# Patient Record
Sex: MALE | Race: WHITE | NOT HISPANIC OR LATINO | URBAN - METROPOLITAN AREA
[De-identification: names, ages, dates, MRNs, and addresses within clinical notes are randomized per-mention and may not be internally consistent; named-entity substitution may affect disease eponyms.]

---

## 2021-03-16 ENCOUNTER — OFFICE VISIT (OUTPATIENT)
Dept: FAMILY MEDICINE | Facility: CLINIC | Age: 21
End: 2021-03-16
Payer: COMMERCIAL

## 2021-03-16 VITALS
HEART RATE: 90 BPM | WEIGHT: 207.4 LBS | BODY MASS INDEX: 28.09 KG/M2 | OXYGEN SATURATION: 95 % | TEMPERATURE: 98.8 F | SYSTOLIC BLOOD PRESSURE: 130 MMHG | HEIGHT: 72 IN | DIASTOLIC BLOOD PRESSURE: 78 MMHG

## 2021-03-16 DIAGNOSIS — R05.9 COUGH: ICD-10-CM

## 2021-03-16 DIAGNOSIS — J06.9 VIRAL UPPER RESPIRATORY TRACT INFECTION: ICD-10-CM

## 2021-03-16 DIAGNOSIS — J02.9 ACUTE PHARYNGITIS, UNSPECIFIED ETIOLOGY: Primary | ICD-10-CM

## 2021-03-16 LAB — S PYO AG THROAT QL IA.RAPID: NEGATIVE

## 2021-03-16 ASSESSMENT — MIFFLIN-ST. JEOR: SCORE: 1996.94

## 2021-03-16 NOTE — PROGRESS NOTES
"       HPI       Gorge Moses is a 20 year old  who presents for   Chief Complaint   Patient presents with     Sinus Problem   20 year-old male presents to clinic with persistent cough x 2 weeks, has gotten worse past 4 days. Cough is productive green phleqm by report. Denies fever or chills, but maybe night sweats. Denies SOB or wheezing. Does not feel fatigued.  Has had sinusitis in the past, about 4 years ago. Draining down back of throat and throat is slightly sore. Denies tooth pain, increased pressure on forward bend or rhinorrhea. Has not tried anything for symptoms.   Recently traveled to [place] to see family.  Has had 2 COVID tests that were both negative in the past 1 week. No known exposures. Classes are online.     Problem, Medication and Allergy Lists were reviewed and updated if needed..    Patient is a new patient to this clinic and so  I reviewed/updated the Past Medical History, the Family History and the Social History .History of sinusitis, no asthma, no seasonal allergies.          Review of Systems:   Review of Systems  GEN: negative for fever, has appetite. Sleeping OK  HEENT; as per HPI  RESP: as per HPI  CV: negative chest pain           Physical Exam:     Vitals:    03/16/21 1511   BP: 130/78   Pulse: 90   Temp: 98.8  F (37.1  C)   TempSrc: Oral   SpO2: 95%   Weight: 94.1 kg (207 lb 6.4 oz)   Height: 1.834 m (6' 0.2\")     Body mass index is 27.97 kg/m .  Vital signs normal except BP slightly elevated and SpO2 low normal.      Physical Exam  GEN: alert in NAD.   HEENT: EYEs clear NAHOMY, EOM intact.  Negative erythema or discharge.  Ears: TMs gray with LR. Nose: edematous erythematous turbinates, scant discharge. Negative facial tenderness to palpation.  OP: erythema posterior pharynx, with post nasal drainage evident. White spot left tonsil. Oral mucosa moist.   LYMPH: negative.   NECK: Full ROM  LUNGS: CTA with air entry throughout  CV: HRRR, S1 S2 no MRG  Skin: clear without rash.     " Results:      Results from this visit  Results for orders placed or performed in visit on 03/16/21   Strep Screen Rapid (Group) ([facility])     Status: None   Result Value Ref Range    Rapid Strep A Screen Negative Negative       Assessment and Plan      1. Acute pharyngitis, unspecified etiology  Will send TC  Given erythema  And white patch on exam. Recommend salt water gargles.   - Strep Screen Rapid (Group) ([facility])  - Strep Culture (GABS)    2. Cough  Likely viral as lungs are clear, however, if symptoms persist beyond 7 days or gets fever, needs to call or return.     3. Viral upper respiratory tract infection  Comfort measures: fluids, rest, netti pot. Discussed OTC medications: Mucinex or Robitussin DM as directed.         There are no discontinued medications.    Options for treatment and follow-up care were reviewed with the patient. Gorge Moses  engaged in the decision making process and verbalized understanding of the options discussed and agreed with the final plan.    MYKEL Balderas CNP

## 2021-03-16 NOTE — NURSING NOTE
"20 year old  Chief Complaint   Patient presents with     Sinus Problem       Blood pressure 130/78, pulse 90, temperature 98.8  F (37.1  C), temperature source Oral, height 1.834 m (6' 0.2\"), weight 94.1 kg (207 lb 6.4 oz), SpO2 95 %. Body mass index is 27.97 kg/m .      There is no problem list on file for this patient.      Wt Readings from Last 2 Encounters:   03/16/21 94.1 kg (207 lb 6.4 oz) (95 %, Z= 1.60)*     * Growth percentiles are based on CDC (Boys, 2-20 Years) data.     BP Readings from Last 3 Encounters:   03/16/21 130/78       No Known Allergies    No current outpatient medications on file.     No current facility-administered medications for this visit.        Social History     Tobacco Use     Smoking status: Never Smoker     Smokeless tobacco: Never Used   Substance Use Topics     Alcohol use: None     Drug use: None       Honoring Choices - Health Care Directive Guide offered to patient at time of visit.    Health Maintenance Due   Topic Date Due     PREVENTIVE CARE VISIT  Never done     ADVANCE CARE PLANNING  Never done     DTAP/TDAP/TD IMMUNIZATION (1 - Tdap) Never done     HPV IMMUNIZATION (1 - Male 2-dose series) Never done     HIV SCREENING  Never done     HEPATITIS C SCREENING  Never done         There is no immunization history on file for this patient.    No results found for: PAP      No lab results found.    PHQ-2 ( 1999 Pfizer) 3/16/2021   Q1: Little interest or pleasure in doing things 0   Q2: Feeling down, depressed or hopeless 0   PHQ-2 Score 0       No flowsheet data found.    No flowsheet data found.    No flowsheet data found.      Teresita Calhoun, [facility]  March 16, 2021 3:13 PM    "

## 2021-03-16 NOTE — PATIENT INSTRUCTIONS
Persistent cough without fever: no signs of sinusitis or pneumonia  Try over the counter:  Robitussin DM as per package  Plenty of fluids  Water  If develop fever, feeling worse (Shortness of breath, fatigue), please return to clinic or notify on my chart  Salt water gargles for sore throat (1/2 tsp salt in 1 cup warm water)  Mucalytic like Mucinex alone would be fine too

## 2021-03-16 NOTE — LETTER
March 19, 2021      Gorge Moses  [address] [place] [address]        Dear ,    We are writing to inform you of your test results.    Your test results fall within the expected range(s) or remain unchanged from previous results.  Please continue with current treatment plan.    Resulted Orders   Strep Screen Rapid (Group) ([facility])   Result Value Ref Range    Rapid Strep A Screen Negative Negative   Strep Culture (GABS)   Result Value Ref Range    Specimen Description Throat     Special Requests Specimen collected in eSwab transport (white cap)     Culture Micro No Beta Streptococcus isolated      Hi Gorge, your throat culture is negative. If your cough persists beyond 10 days or you develop fever and chills or shortness of breath,please call or return to the clinic. Thank you.   If you have any questions or concerns, please call the clinic at the number listed above.       Sincerely,      MYKEL Balderas CNP

## 2021-03-18 LAB
BACTERIA SPEC CULT: NORMAL
Lab: NORMAL
SPECIMEN SOURCE: NORMAL

## 2021-06-29 ENCOUNTER — OFFICE VISIT (OUTPATIENT)
Dept: FAMILY MEDICINE | Facility: CLINIC | Age: 21
End: 2021-06-29
Payer: COMMERCIAL

## 2021-06-29 VITALS
SYSTOLIC BLOOD PRESSURE: 144 MMHG | BODY MASS INDEX: 29.26 KG/M2 | HEIGHT: 72 IN | DIASTOLIC BLOOD PRESSURE: 76 MMHG | TEMPERATURE: 98.1 F | HEART RATE: 64 BPM | OXYGEN SATURATION: 95 % | WEIGHT: 216 LBS

## 2021-06-29 DIAGNOSIS — H93.90 EAR LESION: Primary | ICD-10-CM

## 2021-06-29 DIAGNOSIS — B00.9 HERPES SIMPLEX VIRUS INFECTION: ICD-10-CM

## 2021-06-29 RX ORDER — MUPIROCIN 20 MG/G
OINTMENT TOPICAL 3 TIMES DAILY
Qty: 22 G | Refills: 1 | Status: SHIPPED | OUTPATIENT
Start: 2021-06-29 | End: 2021-07-04

## 2021-06-29 RX ORDER — VALACYCLOVIR HYDROCHLORIDE 1 G/1
1000 TABLET, FILM COATED ORAL 2 TIMES DAILY
Qty: 14 TABLET | Refills: 0 | Status: SHIPPED | OUTPATIENT
Start: 2021-06-29 | End: 2023-09-13

## 2021-06-29 ASSESSMENT — ANXIETY QUESTIONNAIRES
3. WORRYING TOO MUCH ABOUT DIFFERENT THINGS: NOT AT ALL
7. FEELING AFRAID AS IF SOMETHING AWFUL MIGHT HAPPEN: NOT AT ALL
1. FEELING NERVOUS, ANXIOUS, OR ON EDGE: SEVERAL DAYS
IF YOU CHECKED OFF ANY PROBLEMS ON THIS QUESTIONNAIRE, HOW DIFFICULT HAVE THESE PROBLEMS MADE IT FOR YOU TO DO YOUR WORK, TAKE CARE OF THINGS AT HOME, OR GET ALONG WITH OTHER PEOPLE: NOT DIFFICULT AT ALL
6. BECOMING EASILY ANNOYED OR IRRITABLE: NOT AT ALL
5. BEING SO RESTLESS THAT IT IS HARD TO SIT STILL: NOT AT ALL
2. NOT BEING ABLE TO STOP OR CONTROL WORRYING: NOT AT ALL
GAD7 TOTAL SCORE: 1

## 2021-06-29 ASSESSMENT — MIFFLIN-ST. JEOR: SCORE: 2031.18

## 2021-06-29 ASSESSMENT — PATIENT HEALTH QUESTIONNAIRE - PHQ9
5. POOR APPETITE OR OVEREATING: NOT AT ALL
SUM OF ALL RESPONSES TO PHQ QUESTIONS 1-9: 0

## 2021-06-29 NOTE — PROGRESS NOTES
"       HPI       Gorge Moses is a 20 year old  who presents for   Chief Complaint   Patient presents with     Ear Problem     feels swelling, yesterday saw provider and they said it was a big bite, this morning there was blood on pillow and in ear       Problem, Medication and Allergy Lists were reviewed and updated if needed..    20 year-old male is an established patient of this clinic.. Here for right sided ear pain. Patient reports he noticed pain to the outside if his right ear starting 3 days ago. He sought medical care 2 days ago and was diagnosed with a bug bite at that time. Patient states that he continued to have pain and woke this morning with dried blood to the outside of his ear as well as dried blood to his pillow case. Patient states last night patient had 4/10 pain and today the pain is 2/10. [place] Has been cleaning it with soap and water. No history of swimmers ear. He denies skin cuts wounds to his knowledge. 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 He has not been camping or hiking recently.          Review of Systems:   Review of Systems   GEN Patient feeling well overall  ENT Ear pain as per HPI, no nasal pressure or drainage, no sore throat. No history of cold sores or herpes. No know exposures.   LUNGS no cough, no shortness of breath  HEART no palpitations or chest pain   GI no nausea vomiting diarrhea constipation  SKIN no new rashes, wounds as per HPI         Physical Exam:     Vitals:    06/29/21 1125   BP: (!) 144/76   Pulse: 64   Temp: 98.1  F (36.7  C)   TempSrc: Oral   SpO2: 95%   Weight: 98 kg (216 lb)   Height: 1.826 m (5' 11.9\")     Body mass index is 29.38 kg/m .       Physical Exam   GEN Well appearing patient in no acute distress  ENT right external ear with wound external ear at opening of canal lateral edge.  Dried blood, easily removed with cotton tipped applicator to reveal 3 honey crusted lesions, " vesicular and clustered in appearance, tender to touch, no surrounding erythemia, no active bleeding or drainage noted. Tenderness noted to right post-aricular space, no pain with tragus tug, external ear slightly swollen in appearance. TM gray with LR. External ear canal clear without  erythema or tenderness with speculum exam. TM visualized and clear. Left external and internal ear clear. Bilateral nares patent, no swelling to mucous membranes, no drainage, no lesions. OP: throat slight erythema posterior pharynx, no tonsillar enlargement or exudate.   Neck supple.  Lungs clear air movement throughout, no wheezing,  Heart s1 s2, RRR, no murmur  Skin: no rashes or other wounds elsewhere      Results:   Labs: cultures of ear pending: Strep and herpes    Assessment and Plan       Right ear lesion   Honey color crust suggests strep skin infection  Culture sent  Apply mupiricin to ear lesion 2-3 times a day for next 5-7 days.   Will notify patient of culture result  Other possibility is herpes infection. Herpes culture sent. Begin Valtrex 1 gram twice daily    1. Ear lesion  - Strep Culture (GABS)  - valACYclovir (VALTREX) 1000 mg tablet; Take 1 tablet (1,000 mg) by mouth 2 times daily for 7 days  Dispense: 14 tablet; Refill: 0  - mupirocin (BACTROBAN) 2 % external ointment; Apply topically 3 times daily for 5 days  Dispense: 22 g; Refill: 1    2. Herpes simplex virus infection    - valACYclovir (VALTREX) 1000 mg tablet; Take 1 tablet (1,000 mg) by mouth 2 times daily for 7 days  Dispense: 14 tablet; Refill: 0       There are no discontinued medications.    Options for treatment and follow-up care were reviewed with the patient. Gorge Moses  engaged in the decision making process and verbalized understanding of the options discussed and agreed with the final plan.    Kanu Murphy FNP student  I was present with the NPP student who participated in the service and in the documentation of the services provided. I have  verified the history and personally performed the physical exam and medical decision making, as documented by the student and edited by MYKEL Woo CNP  06/29/21  4:55 PM      MYKEL Balderas CNP

## 2021-06-29 NOTE — NURSING NOTE
"ROOM:1    Preferred Name: Gorge     20 year old  Chief Complaint   Patient presents with     Ear Problem     feels swelling, yesterday saw provider and they said it was a big bite, this morning there was blood on pillow and in ear       Blood pressure (!) 144/76, pulse 64, temperature 98.1  F (36.7  C), temperature source Oral, height 1.826 m (5' 11.9\"), weight 98 kg (216 lb), SpO2 95 %. Body mass index is 29.38 kg/m .      There is no problem list on file for this patient.      Wt Readings from Last 2 Encounters:   06/29/21 98 kg (216 lb) (96 %, Z= 1.77)*   03/16/21 94.1 kg (207 lb 6.4 oz) (95 %, Z= 1.60)*     * Growth percentiles are based on [facility] (Boys, 2-20 Years) data.     BP Readings from Last 3 Encounters:   06/29/21 (!) 144/76   03/16/21 130/78       No Known Allergies    No current outpatient medications on file.     No current facility-administered medications for this visit.        Social History     Tobacco Use     Smoking status: Never Smoker     Smokeless tobacco: Never Used   Substance Use Topics     Alcohol use: None     Drug use: None       Honoring Choices - Health Care Directive Guide offered to patient at time of visit.    Health Maintenance Due   Topic Date Due     PREVENTIVE CARE VISIT  Never done     ADVANCE CARE PLANNING  Never done     DTAP/TDAP/TD IMMUNIZATION (1 - Tdap) Never done     HPV IMMUNIZATION (1 - Male 2-dose series) Never done     COVID-19 Vaccine (1) Never done     HIV SCREENING  Never done     HEPATITIS C SCREENING  Never done         There is no immunization history on file for this patient.    No results found for: PAP      No lab results found.    PHQ-2 ( 1999 Pfizer) 3/16/2021   Q1: Little interest or pleasure in doing things 0   Q2: Feeling down, depressed or hopeless 0   PHQ-2 Score 0       PHQ-9 SCORE 6/29/2021   PHQ-9 Total Score 0       CHUY-7 SCORE 6/29/2021   Total Score 1       No flowsheet data found.      Teresita Calhoun CMA  June 29, 2021 11:27 AM    "

## 2021-06-29 NOTE — LETTER
July 1, 2021      Randall R Josué  [address] [place] [address]        Dear ,    We are writing to inform you of your test results.    Test results indicate you may require additional follow up, see comment below.    Resulted Orders   Strep Culture (GABS)   Result Value Ref Range    Specimen Description Right Ear     Special Requests Specimen collected in eSwab transport (white cap)     Culture Micro       Canceled, Test credited  Inappropriate specimen type  Test reordered as correct code      Culture Micro       Notification of test cancellation was given to  Sandra Ball CNP via EPIC message 6/30/21. scg      Culture Micro       Notification of test cancellation was given to  Sandrine from [facility] clinic. 7/1/21 at 0824.TV.     Ear Culture Aerobic Bacterial   Result Value Ref Range    Specimen Description Right Ear     Special Requests Specimen collected in eSwab transport (white cap)     Culture Micro Heavy growth  Staphylococcus aureus   (A)     Culture Micro Culture in progress    Herpes Simplex Virus 1&2 PCR Swab   Result Value Ref Range    Herpes Specimen Description Right Ear     HSV Type 1 PCR Not Detected NDET^Not Detected      Comment:      Herpes simplex virus Type 1 DNA NOT detected, presumed negative for HSV-1. A   negative result does not rule out the presence of PCR inhibitors or HSV DNA in   concentrations below the limit of detection of the assay.      HSV Type 2 PCR Not Detected NDET^Not Detected      Comment:      Herpes simplex virus Type 2 DNA NOT detected, presumed negative for HSV-2. A   negative result does not rule out the presence of PCR inhibitors or HSV DNA in   concentrations below the limit of detection of the assay.      HSV Comment (Note)       Comment:      The [employer] Molecular Simplexa HSV 1 & 2 Direct assay on the [employer]   instrument is an FDA approved, real-time PCR test for the qualitative   detection and differentiation of Herpes Simplex virus Types 1 & 2 DNA  in   mucocutaneous and cutaneous lesion swabs from patients with signs and symptoms   of HSV-1 or HSV-2 infection.     Rj Pennington, the culture result of your ear shows heavy staph aureus group. Staph is a normal skin bacteria, but can sometimes overgrow when there is a break in the skin.  I do not yet have sensitivities back which I did request. Meanwhile, the mupiricin ointment I prescribed should work since it is an isolated area. Please let me know if it does not resolve. I am not able to leave a voicemail at the cell number in the chart and did not know if you wanted me to contact your mother. Thank you.     If you have any questions or concerns, please call the clinic at the number listed above.       Sincerely,      MYKEL Balderas CNP

## 2021-06-29 NOTE — PATIENT INSTRUCTIONS
Right ear lesion  Honey color crust suggests strep skin infection  Culture sent  Apply mupiricin to ear lesion 2-3 times a day for next 5-7 days.   Will notify of culture result  Other possibility is herpes infection. Herpes culture sent. Begin Valtrex 1 gram twice daily

## 2021-06-30 DIAGNOSIS — H93.90 EAR LESION: Primary | ICD-10-CM

## 2021-06-30 LAB
HSV1 DNA SPEC QL NAA+PROBE: NOT DETECTED
HSV2 DNA SPEC QL NAA+PROBE: NOT DETECTED
LABORATORY COMMENT REPORT: NORMAL
SPECIMEN SOURCE: NORMAL

## 2021-06-30 ASSESSMENT — ANXIETY QUESTIONNAIRES: GAD7 TOTAL SCORE: 1

## 2021-07-01 LAB
BACTERIA SPEC CULT: NORMAL
Lab: NORMAL
SPECIMEN SOURCE: NORMAL

## 2021-07-05 ENCOUNTER — TELEPHONE (OUTPATIENT)
Dept: FAMILY MEDICINE | Facility: CLINIC | Age: 21
End: 2021-07-05

## 2021-07-05 LAB
BACTERIA SPEC CULT: ABNORMAL
Lab: ABNORMAL
SPECIMEN SOURCE: ABNORMAL

## 2021-07-05 NOTE — TELEPHONE ENCOUNTER
TC to patient to review ear lesion culture results: heavy growth staph aureus and epidermis. No sensitivities back.  Bactroban was prescribed at visit and patient states this has worked well and lesion has cleared up. Sandra HOGUE CNP

## 2022-02-01 ENCOUNTER — OFFICE VISIT (OUTPATIENT)
Dept: FAMILY MEDICINE | Facility: CLINIC | Age: 22
End: 2022-02-01
Payer: COMMERCIAL

## 2022-02-01 VITALS
HEIGHT: 72 IN | HEART RATE: 71 BPM | SYSTOLIC BLOOD PRESSURE: 128 MMHG | OXYGEN SATURATION: 95 % | BODY MASS INDEX: 29.15 KG/M2 | DIASTOLIC BLOOD PRESSURE: 80 MMHG | TEMPERATURE: 98.4 F | WEIGHT: 215.2 LBS

## 2022-02-01 DIAGNOSIS — R07.0 THROAT PAIN: Primary | ICD-10-CM

## 2022-02-01 LAB
DEPRECATED S PYO AG THROAT QL EIA: NEGATIVE
FLUAV AG SPEC QL IA: NEGATIVE
FLUBV AG SPEC QL IA: NEGATIVE
GROUP A STREP BY PCR: NOT DETECTED
MONOCYTES NFR BLD AUTO: NEGATIVE %
SARS-COV-2 RNA RESP QL NAA+PROBE: NORMAL

## 2022-02-01 PROCEDURE — 86308 HETEROPHILE ANTIBODY SCREEN: CPT | Performed by: NURSE PRACTITIONER

## 2022-02-01 PROCEDURE — U0005 INFEC AGEN DETEC AMPLI PROBE: HCPCS | Performed by: NURSE PRACTITIONER

## 2022-02-01 PROCEDURE — 87651 STREP A DNA AMP PROBE: CPT | Performed by: NURSE PRACTITIONER

## 2022-02-01 RX ORDER — ACETAMINOPHEN 325 MG/1
325-650 TABLET ORAL EVERY 6 HOURS PRN
COMMUNITY

## 2022-02-01 ASSESSMENT — MIFFLIN-ST. JEOR: SCORE: 2025.73

## 2022-02-01 NOTE — NURSING NOTE
"ROOM:2    Preferred Name: Gorge     21 year old  Chief Complaint   Patient presents with     Pharyngitis     Fever       Blood pressure (!) 147/81, pulse 71, temperature 98.4  F (36.9  C), temperature source Oral, height 1.831 m (6' 0.1\"), weight 97.6 kg (215 lb 3.2 oz), SpO2 95 %. Body mass index is 29.11 kg/m .      There is no problem list on file for this patient.      Wt Readings from Last 2 Encounters:   02/01/22 97.6 kg (215 lb 3.2 oz)   06/29/21 98 kg (216 lb) (96 %, Z= 1.77)*     * Growth percentiles are based on CDC (Boys, 2-20 Years) data.     BP Readings from Last 3 Encounters:   02/01/22 (!) 147/81   06/29/21 (!) 144/76   03/16/21 130/78       No Known Allergies    Current Outpatient Medications   Medication     acetaminophen (TYLENOL) 325 MG tablet     valACYclovir (VALTREX) 1000 mg tablet     No current facility-administered medications for this visit.       Social History     Tobacco Use     Smoking status: Never Smoker     Smokeless tobacco: Never Used   Substance Use Topics     Alcohol use: None     Drug use: None       Honoring Choices - Health Care Directive Guide offered to patient at time of visit.    Health Maintenance Due   Topic Date Due     PREVENTIVE CARE VISIT  Never done     ADVANCE CARE PLANNING  Never done     HIV SCREENING  Never done     HEPATITIS C SCREENING  Never done     INFLUENZA VACCINE (1) 09/01/2021       Immunization History   Administered Date(s) Administered     COVID-19,PF,Pfizer (12+ Yrs) 08/19/2021, 09/09/2021       No results found for: PAP      No lab results found.    PHQ-2 ( 1999 Pfizer) 2/1/2022 3/16/2021   Q1: Little interest or pleasure in doing things 0 0   Q2: Feeling down, depressed or hopeless 0 0   PHQ-2 Score 0 0   PHQ-2 Total Score (12-17 Years)- Positive if 3 or more points; Administer PHQ-A if positive - 0       PHQ-9 SCORE 6/29/2021   PHQ-9 Total Score 0       CHUY-7 SCORE 6/29/2021   Total Score 1       No flowsheet data found.      Teresita Calhoun, " CMA  February 1, 2022 1:16 PM

## 2022-02-01 NOTE — PATIENT INSTRUCTIONS
Sore throat  Pending: COVID,monospot  Rapid strep test: Negative  Influenza negative  Comfort measures: salt water gargles: 1/4 tsp salt in 1 cup of warm water: Gargle and spit  OTC medications like tylenol for comfort  Rest  Self isolate until symptoms resolve or afebrile x 24-72 hours (Depends on whether COVID Is positive or not); await lab results. Will notify of results.   Seek care if worsening symptoms

## 2022-02-01 NOTE — PROGRESS NOTES
"       HPI       Gorge Moses is a 21 year old  who presents for   Chief Complaint   Patient presents with     Pharyngitis     Fever     21 year-old male presents to clinic for evaluation of sore throat.  Onset was 4 days ago and has worsened over times. Has been taking tylenol for relief and fever. T of up to 100.8, some chills. Denies URI symptoms, cough, sinus pressure. Has headache at times and feels fatigued. Did not got to school today. Did 2 home tests for COVID, both negative. Had a friend recently who had strep throat.      Problem, Medication and Allergy Lists were reviewed and updated if needed..    Patient is an established patient of this clinic..         Review of Systems:   Review of Systems  GEN: fever as per HPI. Fatigue. Eating and drinking OK. Mostly soft foods.   HEENT: as per HPI. Denies ear pain. Has not lost sense of taste or smell.  RESP: Denies cough, SOB. BP elevated which is not common for him by history  CV: negative for chest pain  SKIN: negative for rash       Physical Exam:     Vitals:    02/01/22 1315 02/01/22 1336   BP: (!) 147/81 128/80   BP Location:  Left arm   Patient Position:  Sitting   Cuff Size:  Adult Regular   Pulse: 71    Temp: 98.4  F (36.9  C)    TempSrc: Oral    SpO2: 95%    Weight: 97.6 kg (215 lb 3.2 oz)    Height: 1.831 m (6' 0.1\")      Body mass index is 29.11 kg/m .  Vital signs normal except BP elevated  Bp recheck 128/80     Physical Exam  GEN: Alert male in NAD  HEENT: eyes clear, NAHOMY, Ears: TMs gray with LR, scant cerumen. Nose:  No edema or erythema of turbinates, no discharge. OP: erythema posterior pharynx, tonsils +1-2, no exudate.  Oral mucosa moist  Neck supple. Lymph negative.   LUNGS: CTA with air entry throughout. SpO2 95%. Respirations unlabored.   CV: HRRR, S1, S2,  No MRG  SKIN: negative for rash or lesions.     Results:      Results from this visit  Results for orders placed or performed in visit on 02/01/22   Influenza A/B antigen     Status: " Normal    Specimen: Nasopharyngeal; Swab   Result Value Ref Range    Influenza A antigen Negative Negative    Influenza B antigen Negative Negative    Narrative    Test results must be correlated with clinical data. If necessary, results should be confirmed by a molecular assay or viral culture.   Streptococcus A Rapid Screen w/Reflex to PCR     Status: Normal    Specimen: Throat; Swab   Result Value Ref Range    Group A Strep antigen Negative Negative       Assessment and Plan        1. Throat pain  Comfort measures: OTC tylenol or ibuprofen as directed. Salt water gargles.  RTC if worsening symptoms. TC and COVID tests pending.   - Influenza A/B antigen  - Streptococcus A Rapid Screen w/Reflex to PCR  - Symptomatic; Yes; 1/29/2022 COVID-19 Virus (Coronavirus) by PCR Nasopharyngeal; Future  - Mononucleosis screen; Future  - Symptomatic; Yes; 1/29/2022 COVID-19 Virus (Coronavirus) by PCR Nasopharyngeal  - Mononucleosis screen  - Group A Streptococcus PCR Throat Swab         There are no discontinued medications.    Options for treatment and follow-up care were reviewed with the patient. Gorge Moses  engaged in the decision making process and verbalized understanding of the options discussed and agreed with the final plan.    Elvi Ponce RN    I was present with the NPP student who participated in the services provided. I have verified the history and personally performed the physical exam and medical decision making, as documented by the student and edited by me    MYKEL Balderas CNP  02/01/22  4:47 PM      MYKEL Balderas CNP

## 2022-02-02 LAB — SARS-COV-2 RNA RESP QL NAA+PROBE: NOT DETECTED

## 2022-03-27 ENCOUNTER — HEALTH MAINTENANCE LETTER (OUTPATIENT)
Age: 22
End: 2022-03-27

## 2022-09-25 ENCOUNTER — HEALTH MAINTENANCE LETTER (OUTPATIENT)
Age: 22
End: 2022-09-25

## 2023-05-08 ENCOUNTER — HEALTH MAINTENANCE LETTER (OUTPATIENT)
Age: 23
End: 2023-05-08

## 2023-09-13 ENCOUNTER — OFFICE VISIT (OUTPATIENT)
Dept: FAMILY MEDICINE | Facility: CLINIC | Age: 23
End: 2023-09-13
Payer: COMMERCIAL

## 2023-09-13 VITALS
HEART RATE: 66 BPM | DIASTOLIC BLOOD PRESSURE: 76 MMHG | BODY MASS INDEX: 30.45 KG/M2 | OXYGEN SATURATION: 95 % | SYSTOLIC BLOOD PRESSURE: 114 MMHG | HEIGHT: 72 IN | WEIGHT: 224.8 LBS | TEMPERATURE: 97.9 F | RESPIRATION RATE: 17 BRPM

## 2023-09-13 DIAGNOSIS — K92.1 BLOOD IN STOOL: Primary | ICD-10-CM

## 2023-09-13 DIAGNOSIS — F41.9 ANXIETY: ICD-10-CM

## 2023-09-13 LAB
BASOPHILS # BLD AUTO: 0 10E3/UL (ref 0–0.2)
BASOPHILS NFR BLD AUTO: 0 %
EOSINOPHIL # BLD AUTO: 0.1 10E3/UL (ref 0–0.7)
EOSINOPHIL NFR BLD AUTO: 1 %
ERYTHROCYTE [DISTWIDTH] IN BLOOD BY AUTOMATED COUNT: 12.1 % (ref 10–15)
HCT VFR BLD AUTO: 47.5 % (ref 40–53)
HGB BLD-MCNC: 15.6 G/DL (ref 13.3–17.7)
IMM GRANULOCYTES # BLD: 0 10E3/UL
IMM GRANULOCYTES NFR BLD: 0 %
LYMPHOCYTES # BLD AUTO: 0.7 10E3/UL (ref 0.8–5.3)
LYMPHOCYTES NFR BLD AUTO: 15 %
MCH RBC QN AUTO: 29.1 PG (ref 26.5–33)
MCHC RBC AUTO-ENTMCNC: 32.8 G/DL (ref 31.5–36.5)
MCV RBC AUTO: 89 FL (ref 78–100)
MONOCYTES # BLD AUTO: 0.5 10E3/UL (ref 0–1.3)
MONOCYTES NFR BLD AUTO: 11 %
NEUTROPHILS # BLD AUTO: 3.2 10E3/UL (ref 1.6–8.3)
NEUTROPHILS NFR BLD AUTO: 73 %
NRBC # BLD AUTO: 0 10E3/UL
NRBC BLD AUTO-RTO: 0 /100
PLATELET # BLD AUTO: 195 10E3/UL (ref 150–450)
RBC # BLD AUTO: 5.36 10E6/UL (ref 4.4–5.9)
WBC # BLD AUTO: 4.5 10E3/UL (ref 4–11)

## 2023-09-13 PROCEDURE — 85014 HEMATOCRIT: CPT | Mod: ORL | Performed by: NURSE PRACTITIONER

## 2023-09-13 ASSESSMENT — ENCOUNTER SYMPTOMS
DIARRHEA: 0
SHORTNESS OF BREATH: 0
HEMATOCHEZIA: 1
CONSTIPATION: 0
FATIGUE: 0

## 2023-09-13 ASSESSMENT — PAIN SCALES - GENERAL: PAINLEVEL: NO PAIN (0)

## 2023-09-13 NOTE — PROGRESS NOTES
Today's Date: Sep 13, 2023     Patient Gorge Moses 2000 presents to the clinic today to address   Chief Complaint   Patient presents with    Rectal Problem     Blood in stool, noticed once about a month ago and then again two days ago  Was a small amount of blood, has family history of colon cancer              SUBJECTIVE     History of Present Illness:    23-year-old male presents to clinic to discuss blood in stool.  This first happened approximately 1 month ago where he had a one-time instance of blood in his stool.  He then noticed 2 days ago that he had a second instance of blood in the stool.  He reports that the stools have both been formed and the amount of blood was small.  He denies associated rectal pain, fatigue, shortness of breath, chest pain, constipation, diarrhea, history of hemorrhoids, and new medications.  He denies spending prolonged time on the toilet.  He reports that he has a pimple-like lesion on his right buttock that could have popped/bled.  He denies family history of ulcerative colitis or Crohn's disease.  He reports that he has grandparents on both sides who were diagnosed with colon cancer in their 40s.      Anxiety-patient reports longstanding history of anxiety.  He typically tries to manage it with lifestyle modifications.  One of the manifestations of his anxiety is a difficulty falling asleep for fear of an intruder.  To allay this, he sleeps with an emotional support animal.  Since having a SONALI, he reports that he is able to fall asleep much quicker.  He would like a letter endorsing SONALI for his apartment. No other acute concerns/symptoms at time of exam.        Review of Systems   Constitutional: Negative for fatigue.   Respiratory: Negative for shortness of breath.    Cardiovascular: Negative for chest pain.   Gastrointestinal: Positive for hematochezia. Negative for constipation and diarrhea.   Constitutional, HEENT, cardiovascular, pulmonary, gi and gu systems are  "negative, except as otherwise noted.      No Known Allergies     Current Outpatient Medications   Medication Instructions    acetaminophen (TYLENOL) 325-650 mg, Oral, EVERY 6 HOURS PRN    valACYclovir (VALTREX) 1,000 mg, Oral, 2 TIMES DAILY       Past Medical History:   Diagnosis Date    Acute non-recurrent maxillary sinusitis         History reviewed. No pertinent family history.     Social History     Tobacco Use    Smoking status: Never    Smokeless tobacco: Never        History   Sexual Activity    Sexual activity: Not on file            6/29/2021    11:24 AM   PHQ   PHQ-9 Total Score 0   Q9: Thoughts of better off dead/self-harm past 2 weeks Not at all        Immunization History   Administered Date(s) Administered    COVID-19 Bivalent 18+ (Moderna) 01/16/2023    COVID-19 MONOVALENT 12+ (Pfizer) 08/19/2021, 09/09/2021                 OBJECTIVE     /76 (BP Location: Left arm, Patient Position: Sitting, Cuff Size: Adult Large)   Pulse 66   Temp 97.9  F (36.6  C) (Oral)   Resp 17   Ht 1.839 m (6' 0.4\")   Wt 102 kg (224 lb 12.8 oz)   SpO2 95%   BMI 30.15 kg/m       Labs:  No results found for: WBC, HGB, HCT, PLT, CHOL, TRIG, HDL, LDLDIRECT, ALT, AST, NA, CREATININE, BUN, CO2, TSH, PSA, INR, GLUF, HGBA1C, MICROALBUR     Physical Exam  Constitutional:       General: He is not in acute distress.     Appearance: He is not ill-appearing.      Comments: Patient denied chaperone for rectal exam.   Eyes:      Extraocular Movements: Extraocular movements intact.   Cardiovascular:      Rate and Rhythm: Normal rate and regular rhythm.      Heart sounds: Normal heart sounds. No murmur heard.  Pulmonary:      Effort: Pulmonary effort is normal. No respiratory distress.      Breath sounds: Normal breath sounds. No wheezing or rales.   Abdominal:      General: Bowel sounds are normal.      Palpations: Abdomen is soft.      Tenderness: There is no abdominal tenderness.   Genitourinary:     Rectum: No external " hemorrhoid.          Comments: No external hemorrhoids noted on inspection.   Musculoskeletal:      Cervical back: Neck supple.      Right lower leg: No edema.      Left lower leg: No edema.   Lymphadenopathy:      Cervical: No cervical adenopathy.   Neurological:      General: No focal deficit present.      Mental Status: He is alert.   Psychiatric:         Thought Content: Thought content normal.         Judgment: Judgment normal.               ASSESSMENT/PLAN     1. Blood in stool  This is a pleasant 23-year-old male presents to discuss 2 episodes of blood in stool  1 month apart.  His vital signs are stable and he is in no acute distress.  In the absence of associated symptoms such as fatigue, shortness of breath, chest pain-low concern for an active bleed.  We will check a CBC today just to be prudent.    Upon inspection, no appreciable external hemorrhoids were noted.  Suspect that the papular lesion to his right buttock could be contributory.  He was given 3 bacitracin packets to use once daily and advised to monitor the lesion.  Considering his substantial family history of colon cancer, we will have the patient continue to monitor.  Should he have another episode of blood in his stool, we will refer him for a colonoscopy.  - CBC with platelets differential    2. Anxiety  Patient controls anxiety with lifestyle modifications including sleeping with an SONALI.  Letter endorsing SONALI provided patient.        Follow-Up:  - Follow up in as needed, or if symptoms worsen or fail to improve. Disposition pending results.      Options for treatment and follow-up care were reviewed with the patient. Patient engaged in the decision making process and verbalized understanding of the options discussed and agreed with the final plan.  AVS printed and given to patient.    MYKEL Cruz CNP    [facility]  [address] [place] [address]  [id number]  [phone]        Note: Chart documentation was done in part with Dragon Voice Recognition software.  Although reviewed after completion, some word and grammatical errors may remain. Please contact author for any clarification or concerns.

## 2023-09-13 NOTE — NURSING NOTE
"ROOM:1  RAMILA SAWYER    Preferred Name: Gorge     How did you hear about us?  Current Patient    23 year old  Chief Complaint   Patient presents with     Rectal Problem     Blood in stool, noticed once about a month ago and then again two days ago  Was a small amount of blood, has family history of colon cancer        Blood pressure 114/76, pulse 66, temperature 97.9  F (36.6  C), temperature source Oral, resp. rate 17, height 1.839 m (6' 0.4\"), weight 102 kg (224 lb 12.8 oz), SpO2 95 %. Body mass index is 30.15 kg/m .  BP completed using cuff size:        There is no problem list on file for this patient.      Wt Readings from Last 2 Encounters:   09/13/23 102 kg (224 lb 12.8 oz)   02/01/22 97.6 kg (215 lb 3.2 oz)     BP Readings from Last 3 Encounters:   09/13/23 114/76   02/01/22 128/80   06/29/21 (!) 144/76       No Known Allergies    Current Outpatient Medications   Medication     acetaminophen (TYLENOL) 325 MG tablet     valACYclovir (VALTREX) 1000 mg tablet     No current facility-administered medications for this visit.       Social History     Tobacco Use     Smoking status: Never     Smokeless tobacco: Never       Honoring Choices - Health Care Directive Guide offered to patient at time of visit.    Health Maintenance Due   Topic Date Due     ADVANCE CARE PLANNING  Never done     HIV SCREENING  Never done     HEPATITIS C SCREENING  Never done     YEARLY PREVENTIVE VISIT  09/24/2020     DTAP/TDAP/TD IMMUNIZATION (7 - Td or Tdap) 10/15/2022     INFLUENZA VACCINE (1) 09/01/2023       Immunization History   Administered Date(s) Administered     COVID-19 Bivalent 18+ (Moderna) 01/16/2023     COVID-19 MONOVALENT 12+ (Pfizer) 08/19/2021, 09/09/2021       No results found for: PAP    No lab results found.        9/13/2023    12:50 PM 2/1/2022     1:14 PM   PHQ-2 ( 1999 Pfizer)   Q1: Little interest or pleasure in doing things 0 0   Q2: Feeling down, depressed or hopeless 0 0   PHQ-2 Score 0 0           " 6/29/2021    11:24 AM   PHQ-9 SCORE   PHQ-9 Total Score 0           6/29/2021    11:24 AM   CHUY-7 SCORE   Total Score 1            No data to display                Marlen Eason, EMT    September 13, 2023 12:52 PM

## 2023-09-13 NOTE — LETTER
9/13/2023      RE: Gorge Moses  [address] [address]       To Whom This May Concern:    Gorge Moses is my patient, and has been under my care since 09/13/23.  I am intimately familiar with his history and with the functional limitations imposed by his/her disability.  He meets the definition of disability under the Americans with Disabilities Act, the Fair Housing Act, and the Rehabilitation Act of 1973.      Due to mental illness, Gorge has certain limitations regarding anxiety and falling asleep.  In order to help alleviate these difficulties, and to enhance his/her ability to live independently and to fully use and enjoy the dwelling unit you own and/or administer, I am prescribing an emotional support animal that will assist Gorge in coping with his/her disability.    I am familiar with the voluminous professional literature concerning the therapeutic benefits of assistance animals for people with disabilities such as that experienced by Gorge.  Upon request, I will share citations to relevant studies, and would be happy to answer other questions you may have concerning my recommendation that Gorge Moses have an emotional support animal.  Should you have additional question, please do not hesitate to contact me.    Sincerely,      MYKEL Cruz, CNP  [employer]

## 2024-05-11 ENCOUNTER — HEALTH MAINTENANCE LETTER (OUTPATIENT)
Age: 24
End: 2024-05-11

## 2025-05-17 ENCOUNTER — HEALTH MAINTENANCE LETTER (OUTPATIENT)
Age: 25
End: 2025-05-17